# Patient Record
Sex: MALE | Race: WHITE | Employment: OTHER | URBAN - METROPOLITAN AREA
[De-identification: names, ages, dates, MRNs, and addresses within clinical notes are randomized per-mention and may not be internally consistent; named-entity substitution may affect disease eponyms.]

---

## 2024-09-13 ENCOUNTER — OFFICE VISIT (OUTPATIENT)
Dept: URGENT CARE | Facility: CLINIC | Age: 59
End: 2024-09-13
Payer: COMMERCIAL

## 2024-09-13 VITALS
DIASTOLIC BLOOD PRESSURE: 98 MMHG | HEART RATE: 72 BPM | OXYGEN SATURATION: 97 % | SYSTOLIC BLOOD PRESSURE: 148 MMHG | RESPIRATION RATE: 18 BRPM | TEMPERATURE: 97.5 F

## 2024-09-13 DIAGNOSIS — S41.112A LACERATION OF ARM, LEFT, INITIAL ENCOUNTER: Primary | ICD-10-CM

## 2024-09-13 PROCEDURE — G0382 LEV 3 HOSP TYPE B ED VISIT: HCPCS | Performed by: NURSE PRACTITIONER

## 2024-09-13 NOTE — PROGRESS NOTES
[facility]        NAME: Yoni Prasad is a 58 y.o. male  : 1965    MRN: [id number]  DATE: 2024  TIME: 12:43 PM    Assessment and Plan   Laceration of arm, left, initial encounter [S41.112A]  1. Laceration of arm, left, initial encounter          Two puncture sites of left antecubital fossa from nail gun this morning. Sites cleaned and closed with glue and steri strips. Patient states he has had a tdap vaccine but unknown when this was give. Tdap advised to be updated, risks discussed if patient declines update to vaccine. Patient still refuses Tdap at this time. Advised on s/s of infection and advised to proceed to ED if symptoms change. No signs of infection at this time.     Patient Instructions       Follow up with PCP in 3-5 days.  Proceed to  ER if symptoms worsen.    If tests are performed, our office will contact you with results only if changes need to made to the care plan discussed with you at the visit. You can review your full results on [facility].    Chief Complaint     Chief Complaint   Patient presents with    Laceration     Patient here with a cut on his left arm from a nail which happened today. He is unsure when his last TDAP was.          History of Present Illness       Patient here with 2 puncture wounds on his left arm from a nail which happened today. He is unsure when his last TDAP was. States he was using a pneumatic nail gun when one nail grazed his arm and the other nail was actually stuck into his arm and he had to pull this out. Denies N/T of the hand, normal sensations and movement of arm and hand.     Laceration   The laceration is located on the Left arm. The laceration mechanism was a nail. The patient is experiencing no pain. He reports no foreign bodies present. His tetanus status is unknown.       Review of Systems   Review of Systems   Constitutional:  Negative for chills, fatigue and fever.   Respiratory:  Positive for shortness of  breath. Negative for chest tightness.    Cardiovascular:  Negative for chest pain and palpitations.   Skin:  Positive for wound. Negative for color change, pallor and rash.   Neurological:  Negative for weakness and numbness.   Psychiatric/Behavioral:  The patient is nervous/anxious.          Current Medications     No current outpatient medications on file.    Current Allergies     Allergies as of 09/13/2024    (No Known Allergies)            The following portions of the patient's history were reviewed and updated as appropriate: allergies, current medications, past family history, past medical history, past social history, past surgical history and problem list.     History reviewed. No pertinent past medical history.    History reviewed. No pertinent surgical history.    History reviewed. No pertinent family history.      Medications have been verified.        Objective   /98   Pulse 72   Temp 97.5 °F (36.4 °C)   Resp 18   SpO2 97%        Physical Exam     Physical Exam  Vitals and nursing note reviewed.   Constitutional:       General: He is not in acute distress.     Appearance: Normal appearance. He is not ill-appearing.   HENT:      Head: Normocephalic and atraumatic.   Cardiovascular:      Rate and Rhythm: Normal rate and regular rhythm.      Heart sounds: Normal heart sounds, S1 normal and S2 normal.   Pulmonary:      Effort: Pulmonary effort is normal. No accessory muscle usage.      Breath sounds: Normal breath sounds. No wheezing.   Skin:     General: Skin is warm and dry.      Capillary Refill: Capillary refill takes less than 2 seconds.      Findings: Wound present. No rash.             Comments: Two puncture wound left antecubital fossa.    Neurological:      General: No focal deficit present.      Mental Status: He is alert and oriented to person, place, and time.      Motor: Motor function is intact.   Psychiatric:         Attention and Perception: Attention and perception normal.          "Mood and Affect: Mood and affect normal.         Speech: Speech normal.         Behavior: Behavior normal. Behavior is cooperative.         Thought Content: Thought content normal.         Universal Protocol:  procedure performed by consultantConsent: Verbal consent obtained.  Risks and benefits: risks, benefits and alternatives were discussed  Consent given by: patient  Time out: Immediately prior to procedure a \"time out\" was called to verify the correct patient, procedure, equipment, support staff and site/side marked as required.  Timeout called at: 9/13/2024 12:42 PM.  Patient understanding: patient states understanding of the procedure being performed  Patient consent: the patient's understanding of the procedure matches consent given  Patient identity confirmed: verbally with patient and provided demographic data  Laceration repair    Date/Time: 9/13/2024 12:00 PM    Performed by: YESSICA Smith  Authorized by: YESSICA Smith  Body area: upper extremity  Location details: left upper arm  Wound length (cm): 2 puncture sites.  Foreign bodies: no foreign bodies  Tendon involvement: none  Nerve involvement: none  Vascular damage: no    Sedation:  Patient sedated: no      Wound Dehiscence:  Superficial Wound Dehiscence: simple closure      Procedure Details:  Irrigation solution: saline  Irrigation method: syringe  Amount of cleaning: standard  Debridement: none  Degree of undermining: none  Skin closure: Steri-Strips and glue  Approximation: close  Approximation difficulty: simple  Patient tolerance: patient tolerated the procedure well with no immediate complications                  "